# Patient Record
Sex: FEMALE | Race: WHITE | NOT HISPANIC OR LATINO | Employment: UNEMPLOYED | URBAN - METROPOLITAN AREA
[De-identification: names, ages, dates, MRNs, and addresses within clinical notes are randomized per-mention and may not be internally consistent; named-entity substitution may affect disease eponyms.]

---

## 2022-09-08 ENCOUNTER — TELEPHONE (OUTPATIENT)
Dept: OBSTETRICS AND GYNECOLOGY | Facility: CLINIC | Age: 37
End: 2022-09-08

## 2022-09-08 NOTE — TELEPHONE ENCOUNTER
Attempted to contact pt to inform that Dr. Aguilar does not do consults at [facility]. Pt's daughter answered the phone and stated pt was not available.   not applicable (Male)

## 2022-09-08 NOTE — TELEPHONE ENCOUNTER
----- Message from Isai Hicks sent at 9/8/2022  9:54 AM CDT -----  Contact: self  Type: Needs Medical Advice  Who Called: Patient   Best Call Back Number: [phone] (4th floor)  Additional Information: Pt needs hospital consult with Dr. Aguilar  about IUD misplacement plz call back immediately. Pt is at [facility]. Thanks